# Patient Record
Sex: MALE | Race: WHITE
[De-identification: names, ages, dates, MRNs, and addresses within clinical notes are randomized per-mention and may not be internally consistent; named-entity substitution may affect disease eponyms.]

---

## 2018-12-31 ENCOUNTER — HOSPITAL ENCOUNTER (EMERGENCY)
Dept: HOSPITAL 47 - EC | Age: 33
Discharge: HOME | End: 2018-12-31
Payer: COMMERCIAL

## 2018-12-31 VITALS — RESPIRATION RATE: 18 BRPM

## 2018-12-31 VITALS — DIASTOLIC BLOOD PRESSURE: 82 MMHG | SYSTOLIC BLOOD PRESSURE: 111 MMHG | HEART RATE: 71 BPM

## 2018-12-31 VITALS — TEMPERATURE: 98 F

## 2018-12-31 DIAGNOSIS — F17.200: ICD-10-CM

## 2018-12-31 DIAGNOSIS — J18.9: Primary | ICD-10-CM

## 2018-12-31 LAB
ALBUMIN SERPL-MCNC: 4.4 G/DL (ref 3.5–5)
ALP SERPL-CCNC: 72 U/L (ref 38–126)
ALT SERPL-CCNC: 32 U/L (ref 21–72)
ANION GAP SERPL CALC-SCNC: 7 MMOL/L
APTT BLD: 24.5 SEC (ref 22–30)
AST SERPL-CCNC: 27 U/L (ref 17–59)
BASOPHILS # BLD AUTO: 0 K/UL (ref 0–0.2)
BASOPHILS NFR BLD AUTO: 0 %
BUN SERPL-SCNC: 17 MG/DL (ref 9–20)
CALCIUM SPEC-MCNC: 9.9 MG/DL (ref 8.4–10.2)
CHLORIDE SERPL-SCNC: 107 MMOL/L (ref 98–107)
CK SERPL-CCNC: 61 U/L (ref 55–170)
CO2 SERPL-SCNC: 27 MMOL/L (ref 22–30)
D DIMER PPP FEU-MCNC: 0.2 MG/L FEU (ref ?–0.6)
EOSINOPHIL # BLD AUTO: 0.2 K/UL (ref 0–0.7)
EOSINOPHIL NFR BLD AUTO: 2 %
ERYTHROCYTE [DISTWIDTH] IN BLOOD BY AUTOMATED COUNT: 5.63 M/UL (ref 4.3–5.9)
ERYTHROCYTE [DISTWIDTH] IN BLOOD: 13.4 % (ref 11.5–15.5)
GLUCOSE SERPL-MCNC: 88 MG/DL (ref 74–99)
HCT VFR BLD AUTO: 50.7 % (ref 39–53)
HGB BLD-MCNC: 16.6 GM/DL (ref 13–17.5)
INR PPP: 0.9 (ref ?–1.2)
LYMPHOCYTES # SPEC AUTO: 2.9 K/UL (ref 1–4.8)
LYMPHOCYTES NFR SPEC AUTO: 28 %
MAGNESIUM SPEC-SCNC: 2.1 MG/DL (ref 1.6–2.3)
MCH RBC QN AUTO: 29.4 PG (ref 25–35)
MCHC RBC AUTO-ENTMCNC: 32.6 G/DL (ref 31–37)
MCV RBC AUTO: 90 FL (ref 80–100)
MONOCYTES # BLD AUTO: 0.5 K/UL (ref 0–1)
MONOCYTES NFR BLD AUTO: 5 %
NEUTROPHILS # BLD AUTO: 6.7 K/UL (ref 1.3–7.7)
NEUTROPHILS NFR BLD AUTO: 64 %
PLATELET # BLD AUTO: 186 K/UL (ref 150–450)
POTASSIUM SERPL-SCNC: 4.4 MMOL/L (ref 3.5–5.1)
PROT SERPL-MCNC: 6.5 G/DL (ref 6.3–8.2)
PT BLD: 9.8 SEC (ref 9–12)
SODIUM SERPL-SCNC: 141 MMOL/L (ref 137–145)
TROPONIN I SERPL-MCNC: <0.012 NG/ML (ref 0–0.03)
WBC # BLD AUTO: 10.5 K/UL (ref 3.8–10.6)

## 2018-12-31 PROCEDURE — 96361 HYDRATE IV INFUSION ADD-ON: CPT

## 2018-12-31 PROCEDURE — 82550 ASSAY OF CK (CPK): CPT

## 2018-12-31 PROCEDURE — 85025 COMPLETE CBC W/AUTO DIFF WBC: CPT

## 2018-12-31 PROCEDURE — 36415 COLL VENOUS BLD VENIPUNCTURE: CPT

## 2018-12-31 PROCEDURE — 85730 THROMBOPLASTIN TIME PARTIAL: CPT

## 2018-12-31 PROCEDURE — 71046 X-RAY EXAM CHEST 2 VIEWS: CPT

## 2018-12-31 PROCEDURE — 85379 FIBRIN DEGRADATION QUANT: CPT

## 2018-12-31 PROCEDURE — 83735 ASSAY OF MAGNESIUM: CPT

## 2018-12-31 PROCEDURE — 96374 THER/PROPH/DIAG INJ IV PUSH: CPT

## 2018-12-31 PROCEDURE — 99285 EMERGENCY DEPT VISIT HI MDM: CPT

## 2018-12-31 PROCEDURE — 82553 CREATINE MB FRACTION: CPT

## 2018-12-31 PROCEDURE — 80053 COMPREHEN METABOLIC PANEL: CPT

## 2018-12-31 PROCEDURE — 85610 PROTHROMBIN TIME: CPT

## 2018-12-31 PROCEDURE — 84484 ASSAY OF TROPONIN QUANT: CPT

## 2018-12-31 PROCEDURE — 93005 ELECTROCARDIOGRAM TRACING: CPT

## 2018-12-31 NOTE — XR
EXAM:

  XR Chest, 2 Views

 

CLINICAL HISTORY:

chest pain and SOB. pt. Denies any hx. of heart or lung disease. 

 

TECHNIQUE:

  Frontal and lateral views of the chest.

 

COMPARISON:

  No relevant prior studies available.

 

FINDINGS:

  Lungs:  Small amount of left basilar atelectasis and less likely 

pneumonia.

  Pleural space:  Unremarkable.  No pneumothorax.

  Heart:  Unremarkable.  No cardiomegaly.

  Mediastinum:  Unremarkable.

  Bones/joints:  Moderate mid thoracic scoliosis convexed to the right.

 

IMPRESSION:     

  Small amount of left basilar atelectasis and less likely pneumonia.

## 2018-12-31 NOTE — ED
General Adult HPI





- General


Chief complaint: Shortness of Breath


Stated complaint: TYLER


Time Seen by Provider: 12/31/18 04:25


Source: patient


Mode of arrival: ambulatory


Limitations: no limitations





- History of Present Illness


Initial comments: 


Martell is a 33-year-old male with a history of chronic pain for which she is 

on methadone and an.  Patient presents the emergency department today for 

evaluation of left-sided chest pain.  Patient reports he woke up yesterday 

morning with pain in his left chest.  Pain is described as a sharp stabbing, 

pain is with deep inspiration.  Patient reports he feels short of breath 

because it is painful to take a deep breath.  Patient reports the pain is 

somewhat better when sitting up but not completely relieved.  Patient reports 

that Ultram and methadone did not improve his pain at all.


Patient has no cardiac history.  No history of hypertension hyperlipidemia or 

diabetes.  He has no history of DVT or PE.  She denies any preceding illness 

including fevers, chills, nausea, vomiting or rhinorrhea.








- Related Data


 Previous Rx's











 Medication  Instructions  Recorded


 


Azithromycin [Zithromax Z-pack] 0 mg PO AS DIRECTED #6 tab 12/31/18











 Allergies











Allergy/AdvReac Type Severity Reaction Status Date / Time


 


No Known Allergies Allergy   Verified 12/31/18 05:20














Review of Systems


ROS Statement: 


Those systems with pertinent positive or pertinent negative responses have been 

documented in the HPI.





ROS Other: All systems not noted in ROS Statement are negative.





Past Medical History


Additional Past Medical History / Comment(s): ITP


History of Any Multi-Drug Resistant Organisms: None Reported


Past Surgical History: No Surgical Hx Reported


Past Psychological History: No Psychological Hx Reported


Smoking Status: Current every day smoker


Past Alcohol Use History: None Reported


Past Drug Use History: None Reported





General Exam





- General Exam Comments


Initial Comments: 


Physical Exam


GENERAL:


Patient is well-developed and well-nourished.  


Patient is nontoxic and well-hydrated and is in aren't distress.





HENT:


Normocephalic, Atraumatic. 





EYES:


PERRL, EOMI





PULMONARY:


Unlabored respirations.  No audible rales rhonchi or wheezing was noted.





CARDIOVASCULAR:


There is a regular rate and rhythm without any murmurs gallops or rubs.  





ABDOMEN:


Soft and nontender with normal bowel sounds. 





SKIN:


Skin is clear with no lesions or rashes and otherwise unremarkable.





: 


Deferred





NEUROLOGIC:


Patient is alert and oriented x3.  Moving all extremities spontaneously





MUSCULOSKELETAL:


Normal extremities with adequate strength and full range of motion.  No lower 

extremity swelling or edema.  No calf tenderness.  


Scoliosis





PSYCHIATRIC:


Normal psychiatric evaluation.  





Limitations: no limitations





Limitations: no limitations





Course


 Vital Signs











  12/31/18 12/31/18 12/31/18





  04:15 05:27 07:01


 


Temperature 98.0 F  


 


Pulse Rate 75 63 71


 


Respiratory 20 18 18





Rate   


 


Blood Pressure 129/79 111/92 111/82


 


O2 Sat by Pulse 96 98 96





Oximetry   














EKG Findings





- EKG Comments:


EKG Findings:: EKG obtained at 4:29 AM, rate is 71 rhythm is sinus there is 

normal axis, normal intervals, no acute ST elevations or depressions no 

evidence of acute ischemia or infarction.  There is no diffuse ST elevation 

suggestive of pericarditis.





Medical Decision Making





- Medical Decision Making


Patient was seen and evaluated history was obtained from patient and wife at 

bedside


Patient has no cardiac risk factors aside from being male,  with stabbing 

left-sided chest pain persisted for nearly 24 hours


Chest x-ray unremarkable possible very early left lower lobe infiltrate more 

likely atelectasis


EKG nonischemic





Labs were ordered





She was reevaluated after Toradol he reports resolution of his chest discomfort


Labs were unremarkable, troponin is negative, considering the patient's pain is 

been persistent for over 24 hours troponin is negative I do suspect that this 

pain is secondary to pleuritic pain related to his early pneumonia.  Patient is 

agreeable with this.  Patient will be discharged home with azithromycin advised 

to take NSAIDs for pain and follow-up with primary care physician.  All 

questions pertaining care were answered return parameters were discussed the 

patient was discharged home in stable condition.








- Lab Data


Result diagrams: 


 12/31/18 04:29





 12/31/18 04:29


 Lab Results











  12/31/18 12/31/18 12/31/18 Range/Units





  04:29 04:29 04:29 


 


WBC   10.5   (3.8-10.6)  k/uL


 


RBC   5.63   (4.30-5.90)  m/uL


 


Hgb   16.6   (13.0-17.5)  gm/dL


 


Hct   50.7   (39.0-53.0)  %


 


MCV   90.0   (80.0-100.0)  fL


 


MCH   29.4   (25.0-35.0)  pg


 


MCHC   32.6   (31.0-37.0)  g/dL


 


RDW   13.4   (11.5-15.5)  %


 


Plt Count   186   (150-450)  k/uL


 


Neutrophils %   64   %


 


Lymphocytes %   28   %


 


Monocytes %   5   %


 


Eosinophils %   2   %


 


Basophils %   0   %


 


Neutrophils #   6.7   (1.3-7.7)  k/uL


 


Lymphocytes #   2.9   (1.0-4.8)  k/uL


 


Monocytes #   0.5   (0-1.0)  k/uL


 


Eosinophils #   0.2   (0-0.7)  k/uL


 


Basophils #   0.0   (0-0.2)  k/uL


 


PT     (9.0-12.0)  sec


 


INR     (<1.2)  


 


APTT     (22.0-30.0)  sec


 


D-Dimer     (<0.60)  mg/L FEU


 


Sodium    141  (137-145)  mmol/L


 


Potassium    4.4  (3.5-5.1)  mmol/L


 


Chloride    107  ()  mmol/L


 


Carbon Dioxide    27  (22-30)  mmol/L


 


Anion Gap    7  mmol/L


 


BUN    17  (9-20)  mg/dL


 


Creatinine    0.83  (0.66-1.25)  mg/dL


 


Est GFR (CKD-EPI)AfAm    >90  (>60 ml/min/1.73 sqM)  


 


Est GFR (CKD-EPI)NonAf    >90  (>60 ml/min/1.73 sqM)  


 


Glucose    88  (74-99)  mg/dL


 


Calcium    9.9  (8.4-10.2)  mg/dL


 


Magnesium    2.1  (1.6-2.3)  mg/dL


 


Total Bilirubin    0.5  (0.2-1.3)  mg/dL


 


AST    27  (17-59)  U/L


 


ALT    32  (21-72)  U/L


 


Alkaline Phosphatase    72  ()  U/L


 


Total Creatine Kinase  61    ()  U/L


 


CK-MB (CK-2)  0.8    (0.0-2.4)  ng/mL


 


CK-MB (CK-2) Rel Index  1.3    


 


Troponin I  <0.012    (0.000-0.034)  ng/mL


 


Total Protein    6.5  (6.3-8.2)  g/dL


 


Albumin    4.4  (3.5-5.0)  g/dL














  12/31/18 Range/Units





  04:29 


 


WBC   (3.8-10.6)  k/uL


 


RBC   (4.30-5.90)  m/uL


 


Hgb   (13.0-17.5)  gm/dL


 


Hct   (39.0-53.0)  %


 


MCV   (80.0-100.0)  fL


 


MCH   (25.0-35.0)  pg


 


MCHC   (31.0-37.0)  g/dL


 


RDW   (11.5-15.5)  %


 


Plt Count   (150-450)  k/uL


 


Neutrophils %   %


 


Lymphocytes %   %


 


Monocytes %   %


 


Eosinophils %   %


 


Basophils %   %


 


Neutrophils #   (1.3-7.7)  k/uL


 


Lymphocytes #   (1.0-4.8)  k/uL


 


Monocytes #   (0-1.0)  k/uL


 


Eosinophils #   (0-0.7)  k/uL


 


Basophils #   (0-0.2)  k/uL


 


PT  9.8  (9.0-12.0)  sec


 


INR  0.9  (<1.2)  


 


APTT  24.5  (22.0-30.0)  sec


 


D-Dimer  0.20  (<0.60)  mg/L FEU


 


Sodium   (137-145)  mmol/L


 


Potassium   (3.5-5.1)  mmol/L


 


Chloride   ()  mmol/L


 


Carbon Dioxide   (22-30)  mmol/L


 


Anion Gap   mmol/L


 


BUN   (9-20)  mg/dL


 


Creatinine   (0.66-1.25)  mg/dL


 


Est GFR (CKD-EPI)AfAm   (>60 ml/min/1.73 sqM)  


 


Est GFR (CKD-EPI)NonAf   (>60 ml/min/1.73 sqM)  


 


Glucose   (74-99)  mg/dL


 


Calcium   (8.4-10.2)  mg/dL


 


Magnesium   (1.6-2.3)  mg/dL


 


Total Bilirubin   (0.2-1.3)  mg/dL


 


AST   (17-59)  U/L


 


ALT   (21-72)  U/L


 


Alkaline Phosphatase   ()  U/L


 


Total Creatine Kinase   ()  U/L


 


CK-MB (CK-2)   (0.0-2.4)  ng/mL


 


CK-MB (CK-2) Rel Index   


 


Troponin I   (0.000-0.034)  ng/mL


 


Total Protein   (6.3-8.2)  g/dL


 


Albumin   (3.5-5.0)  g/dL














Disposition


Clinical Impression: 


 Atypical chest pain, Pneumonia





Disposition: HOME SELF-CARE


Prescriptions: 


Azithromycin [Zithromax Z-pack] 0 mg PO AS DIRECTED #6 tab


Is patient prescribed a controlled substance at d/c from ED?: No


Referrals: 


Chrystal Atkins MD [Primary Care Provider] - 1-2 days